# Patient Record
Sex: MALE | Race: WHITE | Employment: FULL TIME | URBAN - NONMETROPOLITAN AREA
[De-identification: names, ages, dates, MRNs, and addresses within clinical notes are randomized per-mention and may not be internally consistent; named-entity substitution may affect disease eponyms.]

---

## 2018-12-06 ENCOUNTER — HOSPITAL ENCOUNTER (OUTPATIENT)
Dept: GENERAL RADIOLOGY | Age: 63
Discharge: HOME | End: 2018-12-06
Payer: COMMERCIAL

## 2018-12-06 ENCOUNTER — HOSPITAL ENCOUNTER (OUTPATIENT)
Age: 63
Discharge: HOME | End: 2018-12-06
Payer: COMMERCIAL

## 2018-12-06 DIAGNOSIS — R05.9 COUGH: ICD-10-CM

## 2018-12-06 PROCEDURE — 71046 X-RAY EXAM CHEST 2 VIEWS: CPT

## 2019-07-31 ENCOUNTER — HOSPITAL ENCOUNTER (OUTPATIENT)
Dept: CT IMAGING | Age: 64
Discharge: HOME | End: 2019-07-31
Payer: COMMERCIAL

## 2019-07-31 DIAGNOSIS — R10.31 ABDOMINAL PAIN, RIGHT LOWER QUADRANT: ICD-10-CM

## 2019-07-31 LAB — POC CREATININE WHOLE BLOOD: 0.9 MG/DL (ref 0.5–1.2)

## 2019-07-31 PROCEDURE — 82565 ASSAY OF CREATININE: CPT

## 2019-07-31 PROCEDURE — 74177 CT ABD & PELVIS W/CONTRAST: CPT

## 2019-07-31 PROCEDURE — 6360000004 HC RX CONTRAST MEDICATION: Performed by: FAMILY MEDICINE

## 2019-07-31 RX ADMIN — IOHEXOL 50 ML: 240 INJECTION, SOLUTION INTRATHECAL; INTRAVASCULAR; INTRAVENOUS; ORAL at 12:03

## 2019-07-31 RX ADMIN — IOPAMIDOL 85 ML: 755 INJECTION, SOLUTION INTRAVENOUS at 12:03

## 2020-06-29 RX ORDER — LOSARTAN POTASSIUM 25 MG/1
50 TABLET ORAL 2 TIMES DAILY
COMMUNITY
End: 2022-04-08 | Stop reason: SDUPTHER

## 2020-06-29 RX ORDER — CARVEDILOL 6.25 MG/1
6.25 TABLET ORAL 2 TIMES DAILY WITH MEALS
COMMUNITY
End: 2022-01-27

## 2020-06-29 RX ORDER — ZOLPIDEM TARTRATE 5 MG/1
5 TABLET ORAL NIGHTLY PRN
COMMUNITY

## 2020-06-29 RX ORDER — ASPIRIN 81 MG/1
81 TABLET ORAL DAILY
COMMUNITY
End: 2022-01-27

## 2020-06-29 RX ORDER — ATORVASTATIN CALCIUM 40 MG/1
40 TABLET, FILM COATED ORAL DAILY
COMMUNITY
End: 2022-04-08 | Stop reason: SDUPTHER

## 2020-06-29 RX ORDER — METFORMIN HYDROCHLORIDE 500 MG/1
1000 TABLET, EXTENDED RELEASE ORAL 2 TIMES DAILY
COMMUNITY

## 2020-08-04 NOTE — PROGRESS NOTES
[facility] and [facility] initial consultation note    Jean-Paul Davis                                             Chief Complaint:  Consults, machine quit working. PSG done on 2005     Chief complaint: Jean-Paul Davis is a 60 y. o.oldmale came for further evaluation regarding his sleep apnea  with referral from self. [place]:    Sleep/Wake schedule:  Usual time to go to bed during the work/regular day of week: 10:00 PM.  Usual time to wake up during the work//regular day of week: 5:25 AM.  Over the weekends his sleep schedule: [x] Remain same. He usually falls a sleep in less than: 10 minutes. He takes naps: Yes. Number of naps per week:  3 times. During each nap he spends a total of: 5 minutes. He is not using his CPAP device during naps. The naps were reported as refreshing: Yes. Sleep Hygiene:    Is the temperature and evironment in his bed room is acceptable to him: Yes. He watches Television in his bed room: No.  He read books, study, pay bills etc in the bed: No.  Frequency He wake up during night/sleep: 2  Majority of nocturnal awakenings are for urination: Yes. Difficulty in falling back to sleep after nocturnal awakenings: Yes- some times    Do you drink coffee: Yes. 2 to 4cup/s per day. Do you drink caffeinated beverages i.e sodas: Yes. 1can/s per week   Do you drink tea: Yes. 1 cup/s/glasse/s per day [place] in [place]. Do you drink alcoholic beverages: No.  History of recreational drug use: No.     History of tobacco smoking:Yes. Amount of tobacco smokin.0 PPD. Years of tobacco smokin.                                    Quit smoking: Yes. Quit year:       Sleep apnea symptoms:  He was diagnosed with severe obstructive sleep apnea in  and was prescribed with a CPAP machine with a pressure of 8cm H20. His old CPAP machine quit working and he needs a new CPAP device.  He denies any problems with his current mask. He is currently using a nasal mask. He used to use his CPAP with good compliance in the past.    He uses 2 CPAP devices. He travels a lot all over the world. History of headaches in the morning:No.  History of dry mouth in the morning: Yes- some times  History of palpitations during night time/nocturnal awakenings: No  History of sweating during night time/nocturnal awakenings: No    General:  History of head injury in the past: Yes-  He had severe brain concussion at the age of 7years. [x] Not due to MVA. Associated loss of consciousness with head injury: No.  History of seizures: NO.  Rest less legs syndrome symptoms:NO  History suggestive of periodic limb movements during sleep: NO  History suggestive of hypnagogic hallucinations: NO  History suggestive of hypnopompic hallucinations: NO  History suggestive of sleep talking: NO  History suggestive of sleep walking:NO  History suggestive of bruxism: NO     History suggestive of cataplexy: NO  History suggestive of sleep paralysis:NO    Family history of sleep disorders:  Family history of obstructive sleep apnea: NO.  Family history of Narcolepsy: NO.  Family history of Rest less legs syndrome : NO.      History regarding old sleep studies:  Prior history of sleep study: Yes. Please see the diagnostic data section for details. Using CPAP device: No.  Currently using home Oxygen: NO.       Patient considerations:  Is the patient is ambulatory: Yes  Patient is currently using: None of these Wheelchair, Berneta Amsler or [employer].   Para/Quadriplegic: NO  Hearing deficit : NO  Claustrophobic: NO  MDD : NO  Blind: NO  Incontinent: NO  Para/Quadraplegi: NO.   Need transportation to and from Sleep Center:NO    Social History:  Social History     Tobacco Use    Smoking status: Former Smoker     Packs/day: 1.00     Types: Cigarettes     Last attempt to quit: 8/31/2005     Years since quitting: 15.0    Smokeless tobacco: Former User Types: Snuff     Quit date: 8/31/2005   Substance Use Topics    Alcohol use: Not on file    Drug use: Not on file   . He is currently working: Yes. He usually goes to his work at:  7:00AM.   He completes his work at:  5:00PM.                        No past medical history on file. Past Surgical History:   Procedure Laterality Date    CARDIAC SURGERY      CHOLECYSTECTOMY      COLONOSCOPY      HERNIA REPAIR      JOINT REPLACEMENT Right        Allergies   Allergen Reactions    Pcn [Penicillins] Hives    Percocet [Oxycodone-Acetaminophen] Nausea And Vomiting       Current Outpatient Medications   Medication Sig Dispense Refill    carvedilol (COREG) 6.25 MG tablet Take 6.25 mg by mouth 2 times daily (with meals)      losartan (COZAAR) 25 MG tablet Take 25 mg by mouth daily      metFORMIN (GLUCOPHAGE-XR) 500 MG extended release tablet Take 500 mg by mouth daily (with breakfast)      atorvastatin (LIPITOR) 40 MG tablet Take 40 mg by mouth daily      zolpidem (AMBIEN) 5 MG tablet Take 5 mg by mouth nightly as needed for Sleep.  aspirin 81 MG EC tablet Take 81 mg by mouth daily      blood glucose test strips (ASCENSIA AUTODISC VI;ONE TOUCH ULTRA TEST VI) strip 1 each by In Vitro route daily As needed. No current facility-administered medications for this visit. No family history on file. Review of Systems:    General/Constitutional: He gained ~100lbs of weight from his old sleep study done in 2005 with normal appetite. No fever or chills. HENT: Negative. Eyes: Negative. Upper respiratory tract: No nasal stuffiness or post nasal drip. Lower respiratory tract/ lungs: No cough or sputum production. No hemoptysis. Cardiovascular: No palpitations or chest pain. Gastrointestinal: No nausea or vomiting. Neurological: No focal neurologiacal weakness. Extremities: No edema. Musculoskeletal: No complaints. Genitourinary: No complaints. Hematological: Negative. Level  8.0      Average usuage hours per day was:6 hours 44 minutes 56 seconds.           Interface: Nasal     Provider:  []?[facility]             []?[facility]                        []?[employer]          []? [employer]                           []?[facility]      [x]? Other: [facility] joe SCHULTE get download at [address] [facility]         Assessment:  -Severe obstructive sleep apnea on treatment with a CPAP 8cm H20. He is using his PAP device with excellent compliance for >4hours and benefiting from it. His old CPAP machine quit working and he needs a new CPAP device. He denies any problems with his current mask. He gained ~100lbs of weight from his old sleep study done in 2005.  -Inadequate sleep hygiene. -CAD s/p CABG in 2018. It was performed in [place]. -Hypertension on meds- under control.  -Hx of AAA. He is following with his family physicin Dr. Keenan Browne MD.  -Type II DM.  -Hypersomnia ( Excessive daytime sleepiness) due to obstructive sleep apnea Vs Inadequate sleep hygiene. Recommendations/Plan:  -He was offered to have a CPAP retitration at [facility] sleep lab as soon as possible due to a significant weight gain I.e ~100lbs of weight from his old sleep study in 2005. How ever he refused to for re titration study at this time. He want to go for a new CPAP device due to non functioning old machine.  -At the request of patient, he was given a prescription for a new CPAP machine with current pressure of 8cm H20. The new CPAP machine must have capabilities to give downloads regarding his residual AHI and >4hour compliance.  -He advised to keep good compliance with current recommended pressure to get optimal results and clinical improvement.  -Follow with my clinic in 2 to 3months for clinical reevaluation with review of download. -He was advised to call [employer] regarding supplies if needed. -He was advised to practice good sleep hygiene techniques.  He was provided with a hand out.  -He was instructed to not to drive any motor vehicles or operate heavy equipment if he feels sleepy. -He was advised to loose weight by controlling diet and doing exercise once cleared by his family physician/Cardiologist   -He was advised call my office for earlier appointment if needed for worsening of sleep symptoms.   -Chikis Adler and his wife were educated about my impression and plan. Patient verbalizes understanding.

## 2020-08-31 ENCOUNTER — INITIAL CONSULT (OUTPATIENT)
Dept: PULMONOLOGY | Age: 65
End: 2020-08-31
Payer: COMMERCIAL

## 2020-08-31 VITALS
BODY MASS INDEX: 41.95 KG/M2 | HEIGHT: 70 IN | SYSTOLIC BLOOD PRESSURE: 130 MMHG | DIASTOLIC BLOOD PRESSURE: 80 MMHG | OXYGEN SATURATION: 97 % | WEIGHT: 293 LBS | HEART RATE: 81 BPM | TEMPERATURE: 97.8 F

## 2020-08-31 PROCEDURE — 99204 OFFICE O/P NEW MOD 45 MIN: CPT | Performed by: INTERNAL MEDICINE

## 2020-08-31 NOTE — PATIENT INSTRUCTIONS
Recommendations/Plan:  -He was offered to have a CPAP retitration at [facility] sleep lab as soon as possible due to a significant weight gain I.e ~100lbs of weight from his old sleep study in 2005. How ever he refused to for re titration study at this time. He want to go for a new CPAP device due to non functioning old machine.  -At the request of patient, he was given a prescription for a new CPAP machine with current pressure of 8cm H20. The new CPAP machine must have capabilities to give downloads regarding his residual AHI and >4hour compliance.  -He advised to keep good compliance with current recommended pressure to get optimal results and clinical improvement.  -Follow with my clinic in 2 to 3months for clinical reevaluation with review of download. -He was advised to call [employer] regarding supplies if needed. -He was advised to practice good sleep hygiene techniques. He was provided with a hand out.  -He was instructed to not to drive any motor vehicles or operate heavy equipment if he feels sleepy. -He was advised to loose weight by controlling diet and doing exercise once cleared by his family physician/Cardiologist   -He was advised call my office for earlier appointment if needed for worsening of sleep symptoms.   -Hannah Rocha and his wife were educated about my impression and plan. Patient verbalizes understanding.

## 2020-08-31 NOTE — PROGRESS NOTES
Chief Complaint:  Consults, machine quit working. PSG done on 04/07/2005  Mallampati airway Class:III  Neck Circumference:20.0 Inches    [place] sleepiness score 8/31/20: {NUMBERS 1-24:00106}. Diagnostic Data:   PAP Download:   Recorded compliance dates:  08/01/2020-08/30/2020  More than 4hour usage compliance was:96.7%. Average residual Apnea- Hypoapnea index on current pressue was:1.0.      PAP Type Cpap   Level  8.0     Average usuage hours per day was:6 hours 44 minutes 56 seconds.      Interface: Nasal    Provider:  [][facility]  [][facility]  [][employer]  [][employer]         [][facility] [x]Other: [facility] ERIS, did get download at [facility]

## 2020-11-16 ENCOUNTER — HOSPITAL ENCOUNTER (OUTPATIENT)
Age: 65
Setting detail: SPECIMEN
Discharge: HOME | End: 2020-11-16
Payer: COMMERCIAL

## 2020-11-16 PROCEDURE — U0003 INFECTIOUS AGENT DETECTION BY NUCLEIC ACID (DNA OR RNA); SEVERE ACUTE RESPIRATORY SYNDROME CORONAVIRUS 2 (SARS-COV-2) (CORONAVIRUS DISEASE [COVID-19]), AMPLIFIED PROBE TECHNIQUE, MAKING USE OF HIGH THROUGHPUT TECHNOLOGIES AS DESCRIBED BY CMS-2020-01-R: HCPCS

## 2020-11-17 LAB — SARS-COV-2: DETECTED

## 2021-07-19 ENCOUNTER — TELEPHONE (OUTPATIENT)
Dept: PULMONOLOGY | Age: 66
End: 2021-07-19

## 2021-07-19 NOTE — TELEPHONE ENCOUNTER
Noted.  Some insurances will stop paying for PAP if they do not follow up with in 6-8 weeks.   He needs to check with his insurance

## 2021-07-19 NOTE — TELEPHONE ENCOUNTER
Patient was scheduled for 7/22 due to a new   but patient needed to reschedule due to not in town. He works out of town and he isn't sure when he is coming back but He stated he has been wearing the machine and everything is good for now but wasn't sure if he was able to talk to you over the phone but I did set up for an appt for 10/21 since he will be off that day.

## 2021-07-19 NOTE — TELEPHONE ENCOUNTER
I called and spoke to wife and She will let him know to check with [employer] as well. For now we will kept appt for 10/21.

## 2021-10-21 ENCOUNTER — OFFICE VISIT (OUTPATIENT)
Dept: PULMONOLOGY | Age: 66
End: 2021-10-21
Payer: COMMERCIAL

## 2021-10-21 VITALS
OXYGEN SATURATION: 98 % | DIASTOLIC BLOOD PRESSURE: 82 MMHG | TEMPERATURE: 97.5 F | WEIGHT: 276.2 LBS | SYSTOLIC BLOOD PRESSURE: 136 MMHG | HEIGHT: 70 IN | BODY MASS INDEX: 39.54 KG/M2 | HEART RATE: 65 BPM

## 2021-10-21 DIAGNOSIS — Z99.89 OSA ON CPAP: Primary | ICD-10-CM

## 2021-10-21 DIAGNOSIS — G47.33 OSA ON CPAP: Primary | ICD-10-CM

## 2021-10-21 DIAGNOSIS — E66.9 OBESITY (BMI 30-39.9): ICD-10-CM

## 2021-10-21 PROCEDURE — 99213 OFFICE O/P EST LOW 20 MIN: CPT | Performed by: PHYSICIAN ASSISTANT

## 2021-10-21 RX ORDER — DAPAGLIFLOZIN 5 MG/1
TABLET, FILM COATED ORAL
COMMUNITY
Start: 2021-07-15 | End: 2022-04-08 | Stop reason: SDUPTHER

## 2021-10-21 ASSESSMENT — ENCOUNTER SYMPTOMS
WHEEZING: 0
NAUSEA: 0
DIARRHEA: 0
CHEST TIGHTNESS: 0
STRIDOR: 0
BACK PAIN: 0
ALLERGIC/IMMUNOLOGIC NEGATIVE: 1
SHORTNESS OF BREATH: 0
EYES NEGATIVE: 1
COUGH: 0

## 2021-10-21 NOTE — PROGRESS NOTES
[facility]      Balwinder Romero         [id number]  10/21/2021   Chief Complaint   Patient presents with    Follow-up     RAISA new machine         Pt of Dr. Aisha Higgins    PAP Download:   Zahra Leal or initial AHI: 92.2     Date of initial study: 7/9/2011      Compliant  100%     Noncompliant 0 %     PAP Type AIrsense 10 autoset Level  8   Avg Hrs/Day 7 hr 19 min  AHI: 0.7   Recorded compliance dates , 9/19/2021  to 10/18/2021   Machine/Mfg:   [x] ResMed    [] Respironics/Dreamstation   Interface:   [x] Nasal    [] Nasal pillows   [] FFM      Provider:      [x] [facility]     [][facility]     [] [employer]    [] [employer]    [] [employer]               [] [facility]      [] [employer]    [] [address]      [] Other    Neck Size: 20  Mallampati Mallampati 3  ESS:  4  SAQLI: 98    Here is a scan of the most recent download:            Presentation:   Jamal Macedo presents for sleep medicine follow up for obstructive sleep apnea  Since the last visit, Jamal Macedo is doing well with new PAP. He was set up 4-5 months ago on new PAP. Occ waking up through the night since having Covid in Nov.  Occ taking Ambien 2.5- 5 mg or Melatonin for insomnia. He does not take both at same time. He has 2 PAPs and wants to use one for travel and one for home. He travels frequently for work. Equipment issues: The pressure is  acceptable, the mask is acceptable     Sleep issues:  Do you feel better? Yes  More rested? Yes   Better concentration? yes    Progress History:   Since last visit any new medical issues? Covid  New ER or hospital visits? No  Any new or changes in medicines? No  Any new sleep medicines? No    Review of Systems -   Review of Systems   Constitutional: Negative for activity change, appetite change, chills and fever. HENT: Negative for congestion and postnasal drip. Eyes: Negative. Respiratory: Negative for cough, chest tightness, shortness of breath, wheezing and stridor. Cardiovascular: Negative for chest pain and leg swelling. Gastrointestinal: Negative for diarrhea and nausea. Endocrine: Negative. Genitourinary: Negative. Musculoskeletal: Negative. Negative for arthralgias and back pain. Skin: Negative. Allergic/Immunologic: Negative. Neurological: Negative. Negative for dizziness and light-headedness. Psychiatric/Behavioral: Negative. All other systems reviewed and are negative. Physical Exam:    BMI:  Body mass index is 39.63 kg/m². Wt Readings from Last 3 Encounters:   10/21/21 276 lb 3.2 oz (125.3 kg)   08/31/20 293 lb (132.9 kg)     Weight lost 17 lbs over 1 year  Vitals: /82 (Site: Right Upper Arm, Position: Sitting, Cuff Size: Large Adult)   Pulse 65   Temp 97.5 °F (36.4 °C) (Temporal)   Ht 5' 10\" (1.778 m)   Wt 276 lb 3.2 oz (125.3 kg)   SpO2 98%   BMI 39.63 kg/m²       Physical Exam  Constitutional:       Appearance: He is well-developed. HENT:      Head: Normocephalic and atraumatic. Right Ear: External ear normal.      Left Ear: External ear normal.   Eyes:      Conjunctiva/sclera: Conjunctivae normal.      Pupils: Pupils are equal, round, and reactive to light. Cardiovascular:      Rate and Rhythm: Normal rate and regular rhythm. Heart sounds: Normal heart sounds. Pulmonary:      Effort: Pulmonary effort is normal.      Breath sounds: Normal breath sounds. Musculoskeletal:         General: Normal range of motion. Cervical back: Normal range of motion and neck supple. Skin:     General: Skin is warm and dry. Neurological:      Mental Status: He is alert and oriented to person, place, and time. Psychiatric:         Behavior: Behavior normal.         Thought Content: Thought content normal.         Judgment: Judgment normal.           ASSESSMENT/DIAGNOSIS     Diagnosis Orders   1. RAISA on CPAP     2. Obesity (BMI 30-39. 9)              Plan   Do you need any equipment today?  Yes update supplies  - Will need download from both PAPs next year  - Download reviewed and discussed with patient  - He  was advised to continue current positive airway pressure therapy with above described pressure. - He  advised to keep good compliance with current recommended pressure to get optimal results and clinical improvement  - Recommend 7-9 hours of sleep with PAP  - He was advised to call [employer] regarding supplies if needed.   -He call my office for earlier appointment if needed for worsening of sleep symptoms.   - He was instructed on weight loss  - Candelario Favre was educated about my impression and plan. Patient verbalizesunderstanding.   We will see Taylor Comment back in: 1 months with download    Information added by my medical assistant/LPN was reviewed today          Lord Simba HADDAD, VIET  10/21/2021

## 2021-12-15 ENCOUNTER — TELEPHONE (OUTPATIENT)
Dept: CARDIOLOGY CLINIC | Age: 66
End: 2021-12-15

## 2021-12-15 NOTE — TELEPHONE ENCOUNTER
Lm for pt to call office pt needs new pt appt with Dr. Parmjit Rose after his stress and echo on 1/3/2022

## 2022-01-10 ENCOUNTER — HOSPITAL ENCOUNTER (OUTPATIENT)
Dept: NON INVASIVE DIAGNOSTICS | Age: 67
Discharge: HOME | End: 2022-01-10
Payer: COMMERCIAL

## 2022-01-10 VITALS — HEIGHT: 70 IN | WEIGHT: 272 LBS | BODY MASS INDEX: 38.94 KG/M2

## 2022-01-10 DIAGNOSIS — I25.10 ATHEROSCLEROSIS OF NATIVE CORONARY ARTERY OF NATIVE HEART WITHOUT ANGINA PECTORIS: ICD-10-CM

## 2022-01-10 DIAGNOSIS — R00.1 SEVERE SINUS BRADYCARDIA: ICD-10-CM

## 2022-01-10 LAB
LV EF: 53 %
LVEF MODALITY: NORMAL

## 2022-01-10 PROCEDURE — 78452 HT MUSCLE IMAGE SPECT MULT: CPT | Performed by: INTERNAL MEDICINE

## 2022-01-10 PROCEDURE — 93306 TTE W/DOPPLER COMPLETE: CPT

## 2022-01-10 PROCEDURE — 93016 CV STRESS TEST SUPVJ ONLY: CPT | Performed by: INTERNAL MEDICINE

## 2022-01-10 PROCEDURE — A9500 TC99M SESTAMIBI: HCPCS | Performed by: FAMILY MEDICINE

## 2022-01-10 PROCEDURE — 3430000000 HC RX DIAGNOSTIC RADIOPHARMACEUTICAL: Performed by: FAMILY MEDICINE

## 2022-01-10 PROCEDURE — 78452 HT MUSCLE IMAGE SPECT MULT: CPT

## 2022-01-10 PROCEDURE — 93017 CV STRESS TEST TRACING ONLY: CPT | Performed by: INTERNAL MEDICINE

## 2022-01-10 PROCEDURE — 6360000002 HC RX W HCPCS

## 2022-01-10 PROCEDURE — 93018 CV STRESS TEST I&R ONLY: CPT | Performed by: INTERNAL MEDICINE

## 2022-01-10 RX ADMIN — Medication 30.7 MILLICURIE: at 13:15

## 2022-01-10 RX ADMIN — Medication 8.1 MILLICURIE: at 12:15

## 2022-01-27 ENCOUNTER — OFFICE VISIT (OUTPATIENT)
Dept: CARDIOLOGY CLINIC | Age: 67
End: 2022-01-27
Payer: COMMERCIAL

## 2022-01-27 VITALS
SYSTOLIC BLOOD PRESSURE: 140 MMHG | HEART RATE: 44 BPM | BODY MASS INDEX: 40.63 KG/M2 | WEIGHT: 283.2 LBS | DIASTOLIC BLOOD PRESSURE: 74 MMHG

## 2022-01-27 DIAGNOSIS — E78.5 HYPERLIPIDEMIA, UNSPECIFIED HYPERLIPIDEMIA TYPE: ICD-10-CM

## 2022-01-27 DIAGNOSIS — Z95.1 HX OF CABG: Primary | ICD-10-CM

## 2022-01-27 PROCEDURE — 99204 OFFICE O/P NEW MOD 45 MIN: CPT | Performed by: INTERNAL MEDICINE

## 2022-01-27 RX ORDER — ASPIRIN 325 MG
325 TABLET ORAL DAILY
COMMUNITY
End: 2022-01-27

## 2022-01-27 NOTE — PROGRESS NOTES
Moved from [place] 4 years ago    Pt travels a lot for work    Losartan was increased from 25 daily to 50 mg bid

## 2022-01-27 NOTE — PROGRESS NOTES
[id number] [facility] [place] [address]  [facility] [place] [place] [address]  Dept: [phone]  Dept Fax: [phone]  Loc: [phone]    Visit Date: 1/27/2022    Mr. Sandra Blackwell is a 61 y.o. male  who presented for:  Chief Complaint   Patient presents with    New Patient    Palpitations       HPI:   HPI   62 yo M hx of CABG 2018, DM II, bradycardia, neuropathy, RAISA on CPAP works as a  who presents to establish care. He noticed some fluttering in his chest last year. NP stopped Coreg. Fluttering stopped. He had COVID last year. Moved from [facility] to [address] [place] [address] to retire. He notices a pounding heart beat. No lightheadedness, no dizziness. No significant a/t/d. Wife present. BB caused significant fatigue. No blood thinner. No chest pain, angina, TOMPKINS, orthopnea, PND, sob at rest, LE edema, or syncope. Right sided pinching headache at times as well. No major family hx.  7 kids total. Daily ASA compliant. Current Outpatient Medications:     aspirin 325 MG tablet, Take 325 mg by mouth daily, Disp: , Rfl:     FARXIGA 5 MG tablet, TAKE 1 TABLET BY MOUTH ONCE DAILY, Disp: , Rfl:     losartan (COZAAR) 25 MG tablet, Take 50 mg by mouth 2 times daily , Disp: , Rfl:     metFORMIN (GLUCOPHAGE-XR) 500 MG extended release tablet, Take 1,000 mg by mouth 2 times daily , Disp: , Rfl:     atorvastatin (LIPITOR) 40 MG tablet, Take 40 mg by mouth daily, Disp: , Rfl:     zolpidem (AMBIEN) 5 MG tablet, Take 5 mg by mouth nightly as needed for Sleep., Disp: , Rfl:     blood glucose test strips (ASCENSIA AUTODISC VI;ONE TOUCH ULTRA TEST VI) strip, 1 each by In Vitro route daily As needed. , Disp: , Rfl:     Past Medical History  Anna Godfrey  has no past medical history on file. Social History  Anna Godfrey  reports that he quit smoking about 16 years ago. His smoking use included cigarettes. He smoked 1.00 pack per day.  He quit smokeless tobacco use about 16 years ago. His smokeless tobacco use included snuff. Family History  Forest Cervantes family history includes Heart Surgery in his father. There is no family history of bicuspid aortic valve, aneurysms, heart transplant, pacemakers, defibrillators, or sudden cardiac death. Past Surgical History   Past Surgical History:   Procedure Laterality Date    CARDIAC SURGERY      CHOLECYSTECTOMY      COLONOSCOPY      HERNIA REPAIR      JOINT REPLACEMENT Right        Review of Systems   Constitutional: Negative for chills and fever  HENT: Negative for congestion, sinus pressure, sneezing and sore throat. Eyes: Negative for pain, discharge, redness and itching. Respiratory: Negative for apnea, cough  Gastrointestinal: Negative for blood in stool, constipation, diarrhea   Endocrine: Negative for cold intolerance, heat intolerance, polydipsia. Genitourinary: Negative for dysuria, enuresis, flank pain and hematuria. Musculoskeletal: Negative for arthralgias, joint swelling and neck pain. Neurological: Negative for numbness and headaches. Psychiatric/Behavioral: Negative for agitation, confusion, decreased concentration and dysphoric mood. Objective:     BP (!) 150/68   Pulse (!) 44   Wt 283 lb 3.2 oz (128.5 kg)   BMI 40.63 kg/m²     Wt Readings from Last 3 Encounters:   01/27/22 283 lb 3.2 oz (128.5 kg)   01/10/22 272 lb (123.4 kg)   10/21/21 276 lb 3.2 oz (125.3 kg)     BP Readings from Last 3 Encounters:   01/27/22 (!) 150/68   10/21/21 136/82   08/31/20 130/80       Nursing note and vitals reviewed. Physical Exam   Constitutional: Oriented to person, place, and time. Appears well-developed and well-nourished. HENT:   Head: Normocephalic and atraumatic. Eyes: EOM are normal. Pupils are equal, round, and reactive to light. Neck: Normal range of motion. Neck supple. No JVD present. Cardiovascular: Normal rate, regular rhythm, normal heart sounds and intact distal pulses.     No murmur heard.  Pulmonary/Chest: Effort normal and breath sounds normal. No respiratory distress. No wheezes. No rales. Abdominal: Soft. Bowel sounds are normal. No distension. There is no tenderness. Musculoskeletal: Normal range of motion. No edema. Neurological: Alert and oriented to person, place, and time. No cranial nerve deficit. Coordination normal.   Skin: Skin is warm and dry. Psychiatric: Normal mood and affect. No results found for: CKTOTAL, CKMB, CKMBINDEX    No results found for: WBC, RBC, HGB, HCT, MCV, MCH, MCHC, RDW, PLT, MPV    No results found for: NA, K, CL, CO2, BUN, LABALBU, CREATININE, CALCIUM, GFRAA, LABGLOM, GLUCOSE, GLU    No results found for: ALKPHOS, ALT, AST, PROT, BILITOT, BILIDIR, IBILI, LABALBU    No results found for: MG    No results found for: INR, PROTIME      No results found for: LABA1C    No results found for: TRIG, HDL, LDLCALC, LDLDIRECT, LABVLDL    No results found for: TSH      Testing Reviewed:      I have individually reviewed the cardiac test below:    ECHO: Results for orders placed during the hospital encounter of 01/10/22    Echocardiogram complete    Narrative  Transthoracic Echocardiography Report (TTE)    Demographics    Patient Name  Montrell Angel       Gender            Male  Eliceo Forward    MR #          [id number]       Race              Other    Ethnicity    Account #     [de-identified]       Room Number    Accession     [id number]      Date of Study     01/10/2022  Number    Date of Birth 1955      Referring         Davonte Sánchez MD  Physician    Age           61 year(s)      Sonographer       CYNTHIA Mckeon, RDCS,  RDMS, RVT    Interpreting      Bradley Orellana MD  Physician    Procedure    Type of Study    TTE procedure:ECHOCARDIOGRAM COMPLETE 2D W DOPPLER W COLOR. Procedure Date  Date: 01/10/2022 Start: 10:31 AM    Study Location: Echo Lab  Technical Quality: Adequate visualization    Indications:Bradycardia.     Additional Medical History:sleep apnea, obesity, AAA    Patient Status: Routine    Height: 70 inches Weight: 276.01 pounds BSA: 2.39 m^2 BMI: 39.6 kg/m^2    BP: 136/82 mmHg    Conclusions    Summary  Ejection fraction was estimated at 50-55%. The aortic valve was trileaflet with increased thickness and reduced  cuspal separation. DOPPLER: Transaortic velocity was 2.3 m/s, mean  gradient 11 mmHg, max gradient 21 mmHg, LEONEL 2.4 cm2. There was trace  aortic regurgitation. Ascending aorta = 4.1 cm. IVC size is within normal limits with normal respiratory phasic changes. Signature    ----------------------------------------------------------------  Electronically signed by Larry Beverly MD (Interpreting  physician) on 01/11/2022 at 07:21 AM  ----------------------------------------------------------------    Findings    Mitral Valve  The mitral valve structure demonstrated posterior leaflet calcification. DOPPLER: The transmitral velocity was within the normal range with no  evidence for mitral stenosis. There was no evidence of mitral  regurgitation. Aortic Valve  The aortic valve was trileaflet with increased thickness and reduced  cuspal separation. DOPPLER: Transaortic velocity was 2.3 m/s, mean  gradient 11 mmHg, max gradient 21 mmHg, LEONEL 2.4 cm2. There was trace  aortic regurgitation. Tricuspid Valve  The tricuspid valve structure was normal with normal leaflet separation. DOPPLER: There was no evidence of tricuspid stenosis. There was no  evidence of tricuspid regurgitation. Pulmonic Valve  The pulmonic valve leaflets exhibited normal thickness, no calcification,  and normal cuspal separation. DOPPLER: The transpulmonic velocity was  within the normal range with no evidence for regurgitation. Left Atrium  Left atrial size was normal.    Left Ventricle  Normal left ventricular size and systolic function. There were no regional wall motion abnormalities. Wall thickness was within normal limits.   Ejection fraction was estimated at 50-55%. Right Atrium  Right atrial size was normal.    Right Ventricle  The right ventricular size was normal with normal systolic function and  wall thickness. Pericardial Effusion  The pericardium was normal in appearance with no evidence of a pericardial  effusion. Pleural Effusion  No evidence of pleural effusion. Aorta / Great Vessels  -Ascending aorta = 4.1 cm. -IVC size is within normal limits with normal respiratory phasic changes.     M-Mode/2D Measurements & Calculations    LV Diastolic    LV Systolic Dimension:    AV Cusp Separation: 1.2 cmLA  Dimension: 5.2  3.7 cm                    Dimension: 4 cmAO Root  cm              LV Volume Diastolic: 400  Dimension: 4.1 cmLA Area: 22.9  LV FS:28.9 %    ml                        cm^2  LV PW           LV Volume Systolic: 66.8  Diastolic: 1.4  ml  cm              LV EDV/LV EDV Index: 130  Septum          ml/54 m^2LV ESV/LV ESV    RV Diastolic Dimension: 3.4 cm  Diastolic: 1.2  Index: 23.3 ml/24 m^2  cm              EF Calculated: 55.3 %     LA/Aorta: 0.98  Ascending Aorta: 4.1 cm  LA volume/Index: 68.9 ml /29m^2    LVOT: 2.3 cm    Doppler Measurements & Calculations    MV Peak E-Wave: 113 AV Peak Velocity: 230    LVOT Peak Velocity: 111 cm/s  cm/s                cm/s                     LVOT Mean Velocity: 76.4 cm/s  MV Peak A-Wave: 106 AV Peak Gradient: 21.16  LVOT Peak Gradient: 5  cm/s                mmHg                     mmHgLVOT Mean Gradient: 3  MV E/A Ratio: 1.07  AV Mean Velocity: 153    mmHg  MV Peak Gradient:   cm/s  5.11 mmHg           AV Mean Gradient: 13     TV Peak E-Wave: 50.6 cm/s  mmHg                     TV Peak A-Wave: 51 cm/s  MV Deceleration     AV VTI: 45 cm  Time: 174 msec      AV Area                  TV Peak Gradient: 1.02 mmHg  (Continuity):2.14 cm^2   TR Velocity:263 cm/s  TR Gradient:27.67 mmHg  MV E' Septal        LVOT VTI: 23.2 cm        PV Peak Velocity: 67.7 cm/s  Velocity: 5.1 cm/s  AV P1/2t: 729 msec       PV Peak Gradient: 1.83 mmHg  MV A' Septal        IVRT: 95 msec  Velocity: 9.1 cm/s  MV E' Lateral  Velocity: 8.5 cm/s  AV DVI (VTI): 0.52AV DVI  MV A' Lateral       (Vmax):0.48  Velocity: 10.2 cm/s  E/E' septal: 22.16  E/E' lateral: 13.29  MR Velocity: 375  cm/s    [email or web address] [id number]       Assessment/Plan   CABG 2018  DM II  HTN  HLD  Sinus bradycardia  Preserved EF   Ascending aorta 4.1 cm. Stress shows inferior defect with tanya-infarct ischemia  Records from [place], [facility], has had bleeding related to  mg q day, continue current therapy. BP 150s. Cut ASA back to 81 mg q day due to epistaxis. Usually BP 130s. Continue statin. Follow FLP. Discussed diet/exercise/BP/weight loss/health lifestyle choices/lipids; the patient understands the goals and will try to comply.     Disposition:  6 months         Electronically signed by Tapan Mccallum MD   1/27/2022 at 3:21 PM EST

## 2022-04-08 RX ORDER — DAPAGLIFLOZIN 5 MG/1
TABLET, FILM COATED ORAL
Qty: 30 TABLET | Status: CANCELLED | OUTPATIENT
Start: 2022-04-08

## 2022-04-08 RX ORDER — METFORMIN HYDROCHLORIDE 500 MG/1
1000 TABLET, EXTENDED RELEASE ORAL 2 TIMES DAILY
Qty: 30 TABLET | Status: CANCELLED | OUTPATIENT
Start: 2022-04-08

## 2022-04-08 RX ORDER — ZOLPIDEM TARTRATE 5 MG/1
5 TABLET ORAL NIGHTLY PRN
Status: CANCELLED | OUTPATIENT
Start: 2022-04-08

## 2022-04-08 NOTE — TELEPHONE ENCOUNTER
Mehreen Pierre called requesting a refill on the following medications:  Requested Prescriptions     Pending Prescriptions Disp Refills    atorvastatin (LIPITOR) 40 MG tablet 30 tablet      Sig: Take 1 tablet by mouth daily    metFORMIN (GLUCOPHAGE-XR) 500 MG extended release tablet 30 tablet      Sig: Take 2 tablets by mouth 2 times daily    losartan (COZAAR) 25 MG tablet 30 tablet      Sig: Take 2 tablets by mouth 2 times daily    FARXIGA 5 MG tablet 30 tablet     zolpidem (AMBIEN) 5 MG tablet       Sig: Take 1 tablet by mouth nightly as needed for Sleep. Pharmacy verified: [place] in [facility]  . pv      Date of last visit: 1/27/2022  Date of next visit (if applicable): 4/98/4460

## 2022-04-09 RX ORDER — LOSARTAN POTASSIUM 25 MG/1
50 TABLET ORAL 2 TIMES DAILY
Qty: 60 TABLET | Refills: 2 | Status: SHIPPED | OUTPATIENT
Start: 2022-04-09 | End: 2022-06-13 | Stop reason: SDUPTHER

## 2022-04-09 RX ORDER — ATORVASTATIN CALCIUM 40 MG/1
40 TABLET, FILM COATED ORAL DAILY
Qty: 30 TABLET | Refills: 2 | Status: SHIPPED | OUTPATIENT
Start: 2022-04-09 | End: 2022-07-22 | Stop reason: SDUPTHER

## 2022-04-09 RX ORDER — DAPAGLIFLOZIN 5 MG/1
TABLET, FILM COATED ORAL
Qty: 30 TABLET | Refills: 2 | Status: SHIPPED | OUTPATIENT
Start: 2022-04-09 | End: 2022-08-30 | Stop reason: SDUPTHER

## 2022-05-02 RX ORDER — METFORMIN HYDROCHLORIDE 500 MG/1
1000 TABLET, EXTENDED RELEASE ORAL 2 TIMES DAILY
Qty: 30 TABLET | OUTPATIENT
Start: 2022-05-02

## 2022-05-02 NOTE — TELEPHONE ENCOUNTER
Lola Bashir called requesting a refill on the following medications:  Requested Prescriptions     Pending Prescriptions Disp Refills    metFORMIN (GLUCOPHAGE-XR) 500 MG extended release tablet 30 tablet      Sig: Take 2 tablets by mouth 2 times daily     Pharmacy verified: [facility] in [address] [place] [address]  . pv      Date of last visit: 1/27/2022  Date of next visit (if applicable): 8/13/7644

## 2022-05-03 ENCOUNTER — TELEPHONE (OUTPATIENT)
Dept: CARDIOLOGY CLINIC | Age: 67
End: 2022-05-03

## 2022-05-03 NOTE — TELEPHONE ENCOUNTER
Pt called about his Metformin prescription refill being denied. Pt states Dr Blake Higgins told pt he would take over all of pt's medications. Okay to send in refill for Metformin?    Send to [facility] [employer]

## 2022-05-04 NOTE — TELEPHONE ENCOUNTER
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 Mihn Willson). I called patient and his spouse picked up and she is aware Dr. Brody Olivier will do all his cardiac meds but Metformin needs to be managed by PCP.

## 2022-06-13 RX ORDER — LOSARTAN POTASSIUM 25 MG/1
50 TABLET ORAL 2 TIMES DAILY
Qty: 120 TABLET | Refills: 1 | Status: SHIPPED | OUTPATIENT
Start: 2022-06-13 | End: 2022-08-30 | Stop reason: SDUPTHER

## 2022-06-13 NOTE — TELEPHONE ENCOUNTER
Julia Has called requesting a refill on the following medications:  Requested Prescriptions     Pending Prescriptions Disp Refills    losartan (COZAAR) 25 MG tablet 60 tablet 2     Sig: Take 2 tablets by mouth 2 times daily     [address]  . pv      Date of last visit: 127/2022  Date of next visit (if applicable): 9/13/4638    Took last tablet this morning

## 2022-07-22 RX ORDER — ATORVASTATIN CALCIUM 40 MG/1
40 TABLET, FILM COATED ORAL DAILY
Qty: 30 TABLET | Refills: 1 | Status: SHIPPED | OUTPATIENT
Start: 2022-07-22 | End: 2022-08-30 | Stop reason: SDUPTHER

## 2022-07-22 NOTE — TELEPHONE ENCOUNTER
Byron Archer called requesting a refill on the following medications:  Requested Prescriptions     Pending Prescriptions Disp Refills    atorvastatin (LIPITOR) 40 MG tablet 30 tablet 2     Sig: Take 1 tablet by mouth in the morning.      Pharmacy verified:  [facility]      Date of last visit: 01-27-22  Date of next visit (if applicable): 4/88/8854

## 2022-08-29 ENCOUNTER — HOSPITAL ENCOUNTER (OUTPATIENT)
Dept: ULTRASOUND IMAGING | Age: 67
Discharge: HOME | End: 2022-08-29
Payer: COMMERCIAL

## 2022-08-29 DIAGNOSIS — N40.1 BENIGN PROSTATIC HYPERPLASIA WITH URINARY FREQUENCY: ICD-10-CM

## 2022-08-29 DIAGNOSIS — I71.40 ABDOMINAL AORTIC ANEURYSM WITHOUT RUPTURE: ICD-10-CM

## 2022-08-29 DIAGNOSIS — R35.0 BENIGN PROSTATIC HYPERPLASIA WITH URINARY FREQUENCY: ICD-10-CM

## 2022-08-29 PROCEDURE — 76775 US EXAM ABDO BACK WALL LIM: CPT | Performed by: FAMILY MEDICINE

## 2022-08-29 PROCEDURE — 76775 US EXAM ABDO BACK WALL LIM: CPT

## 2022-08-30 ENCOUNTER — TELEPHONE (OUTPATIENT)
Dept: CARDIOLOGY CLINIC | Age: 67
End: 2022-08-30

## 2022-08-30 ENCOUNTER — OFFICE VISIT (OUTPATIENT)
Dept: CARDIOLOGY CLINIC | Age: 67
End: 2022-08-30
Payer: COMMERCIAL

## 2022-08-30 VITALS
WEIGHT: 278.6 LBS | BODY MASS INDEX: 39.88 KG/M2 | HEIGHT: 70 IN | SYSTOLIC BLOOD PRESSURE: 152 MMHG | HEART RATE: 82 BPM | DIASTOLIC BLOOD PRESSURE: 93 MMHG

## 2022-08-30 DIAGNOSIS — E78.5 HYPERLIPIDEMIA, UNSPECIFIED HYPERLIPIDEMIA TYPE: ICD-10-CM

## 2022-08-30 DIAGNOSIS — I71.21 ANEURYSM, ASCENDING AORTA: ICD-10-CM

## 2022-08-30 DIAGNOSIS — I50.30 HEART FAILURE WITH PRESERVED EJECTION FRACTION, UNSPECIFIED HF CHRONICITY (HCC): ICD-10-CM

## 2022-08-30 DIAGNOSIS — Z95.1 HX OF CABG: Primary | ICD-10-CM

## 2022-08-30 PROCEDURE — 99213 OFFICE O/P EST LOW 20 MIN: CPT | Performed by: NURSE PRACTITIONER

## 2022-08-30 RX ORDER — ATORVASTATIN CALCIUM 40 MG/1
40 TABLET, FILM COATED ORAL DAILY
Qty: 30 TABLET | Refills: 1 | Status: SHIPPED | OUTPATIENT
Start: 2022-08-30

## 2022-08-30 RX ORDER — DAPAGLIFLOZIN 5 MG/1
TABLET, FILM COATED ORAL
Qty: 30 TABLET | Refills: 2 | Status: SHIPPED | OUTPATIENT
Start: 2022-08-30

## 2022-08-30 RX ORDER — ASPIRIN 81 MG/1
81 TABLET ORAL DAILY
COMMUNITY

## 2022-08-30 RX ORDER — LOSARTAN POTASSIUM 25 MG/1
25 TABLET ORAL 2 TIMES DAILY
Qty: 120 TABLET | Refills: 1 | Status: SHIPPED
Start: 2022-08-30

## 2022-08-30 RX ORDER — LOSARTAN POTASSIUM 25 MG/1
50 TABLET ORAL 2 TIMES DAILY
Qty: 120 TABLET | Refills: 1 | Status: SHIPPED | OUTPATIENT
Start: 2022-08-30 | End: 2022-08-30 | Stop reason: DRUGHIGH

## 2022-08-30 NOTE — PATIENT INSTRUCTIONS
Continue current medications as prescribed. Continue with efforts at heart healthy diet and to get some regular exercise. Follow-up with your PCP as scheduled. Follow-up with Dr. Parmjit Rose in one year as scheduled or sooner if need.

## 2022-08-30 NOTE — TELEPHONE ENCOUNTER
Received call from pharmacist at [address] in [place]. Pt there to  scripts. Pt is telling her that he has been taking the Losartan 25 mg BID, not 50 mg BID. Script was sent for Losartan 50 mg BID. Pt is telling her, after his surgery it was changed to 25 mg BID. Which dose would like patient to be on? Please call [address] back at [phone].

## 2022-08-30 NOTE — PROGRESS NOTES
[facility] [facility] [address]  [place] [address]  Dept: [phone]  Dept Fax: [phone]  Loc: [phone]    Visit Date: 8/30/2022    Mr. Danny Durán is a 61 y.o. male  who presented for: 6-month checkup    Primary cardiologist: Milena Sapp MD  Chief Complaint   Patient presents with    6 Month Follow-Up       HPI:   HPI   Last seen in the office per Dr. Dee Rojas on 1/27/2022. Patient seen in evaluation as a new patient for palpitations. Per office note:  62 yo M hx of CABG 2018, DM II, bradycardia, neuropathy, RAISA on CPAP works as a  who presents to establish care. He noticed some fluttering in his chest last year. NP stopped Coreg. Fluttering stopped. He had COVID last year. Moved from [place] to [address] [place] [address] to retire. He notices a pounding heart beat. No lightheadedness, no dizziness. No significant a/t/d. Wife present. BB caused significant fatigue. No blood thinner. No chest pain, angina, TOMPKINS, orthopnea, PND, sob at rest, LE edema, or syncope. Right sided pinching headache at times as well. No major family hx.  7 kids total. Daily ASA compliant. Assessment/Plan   CABG 2018  DM II  HTN  HLD  Sinus bradycardia  Preserved EF   Ascending aorta 4.1 cm. Stress shows inferior defect with tanya-infarct ischemia  Records from [place], [facility], has had bleeding related to  mg q day, continue current therapy. BP 150s. Cut ASA back to 81 mg q day due to epistaxis. Usually BP 130s. Continue statin. Follow FLP. Discussed diet/exercise/BP/weight loss/health lifestyle choices/lipids; the patient understands the goals and will try to comply.   Disposition: 6 months      Current Outpatient Medications:     aspirin 81 MG EC tablet, Take 81 mg by mouth daily, Disp: , Rfl:     atorvastatin (LIPITOR) 40 MG tablet, Take 1 tablet by mouth daily, Disp: 30 tablet, Rfl: 1    FARXIGA 5 MG tablet, TAKE 1 TABLET BY MOUTH ONCE DAILY, Disp: 30 tablet, Rfl: 2    losartan (COZAAR) 25 MG tablet, Take 2 tablets by mouth 2 times daily, Disp: 120 tablet, Rfl: 1    metFORMIN (GLUCOPHAGE-XR) 500 MG extended release tablet, Take 1,000 mg by mouth 2 times daily , Disp: , Rfl:     zolpidem (AMBIEN) 5 MG tablet, Take 5 mg by mouth nightly as needed for Sleep., Disp: , Rfl:     blood glucose test strips (ASCENSIA AUTODISC VI;ONE TOUCH ULTRA TEST VI) strip, 1 each by In Vitro route daily As needed. , Disp: , Rfl:     Past Medical History  Jyothi Blake  has no past medical history on file. Social History  Jyothi Blake  reports that he quit smoking about 17 years ago. His smoking use included cigarettes. He smoked an average of 1 pack per day. He quit smokeless tobacco use about 17 years ago. His smokeless tobacco use included snuff. Family History  Jyothi Blake family history includes Heart Surgery in his father. There is no family history of bicuspid aortic valve, aneurysms, heart transplant, pacemakers, defibrillators, or sudden cardiac death.       Past Surgical History   Past Surgical History:   Procedure Laterality Date    CARDIAC SURGERY      CHOLECYSTECTOMY      COLONOSCOPY      HERNIA REPAIR      JOINT REPLACEMENT Right      Today's visit:   Subjective:  Has been doing well - except has had COVID x 2 since last appointment  Just really tired at times - legs turn to cement - rests and then does ok again  Occasional lingering cough  Each day 12 - 2 throat gets scratchy; head congestion - lasts until early tamela - then resolves - just really since last event of COVID; still having phantom smells  No chest pain or tightness  Some TOMPKINS - if rushing - if rests readily resolves  Performs ADL without issue  No swelling in feet or ankles  No bleeding issues since lower dose ASA  No further palpitations since medication change  BP normally runs 130/80 - has been fine at last 2 visits with PCP  Had AAA measured yesterday  Testing on prostate in process - PSA in process  Dr. Delfino Muniz following AAA, and lipid levels    Review of Systems   Constitutional: Negative for chills and fever  HENT: some congestion, sinus pressure, sneezing and scratchy throat as above. Eyes: Negative for pain, discharge, redness and itching. Respiratory: Negative for PND, orthopnea; (+) occasional cough  Gastrointestinal: Negative for blood in stool, constipation, diarrhea   Endocrine: Negative for cold intolerance, heat intolerance, polydipsia. Genitourinary: Negative for dysuria, hematuria. Musculoskeletal: Negative for arthralgias, joint swelling and neck pain. Neurological: Negative for numbness and headaches. Psychiatric/Behavioral: Negative for agitation, confusion, decreased concentration and dysphoric mood. Objective:     BP (!) 152/93   Pulse 82   Ht 5' 10\" (1.778 m)   Wt 278 lb 9.6 oz (126.4 kg)   BMI 39.97 kg/m²     Wt Readings from Last 3 Encounters:   08/30/22 278 lb 9.6 oz (126.4 kg)   01/27/22 283 lb 3.2 oz (128.5 kg)   01/10/22 272 lb (123.4 kg)     BP Readings from Last 3 Encounters:   08/30/22 (!) 152/93   01/27/22 (!) 140/74   10/21/21 136/82       Nursing note and vitals reviewed. Physical Exam   Constitutional: Oriented to person, place, and time. Appears well-developed and well-nourished. Pleasant, talkative; appears well. Accompanied by his spouse. HENT:   Head: Normocephalic and atraumatic. Eyes: EOM are normal. Pupils are equal, round, and reactive to light. Neck: Normal range of motion. Neck supple. No JVD present. No carotid bruits. Cardiovascular: Normal rate, regular rhythm, normal heart sounds and intact distal pulses. No murmur heard. Pulmonary/Chest: Effort normal and breath sounds normal. No respiratory distress. No wheezes. No rales. Abdominal: Obese; Soft. Bowel sounds are normal. No distension. There is no tenderness. Musculoskeletal: Normal range of motion. No edema.    Neurological: Alert and oriented to person, place, and time. No cranial nerve deficit. Coordination normal.   Skin: Skin is warm and dry. Psychiatric: Normal mood and affect. No results found for: CKTOTAL, CKMB, CKMBINDEX    No results found for: WBC, RBC, HGB, HCT, MCV, MCH, MCHC, RDW, PLT, MPV    No results found for: NA, K, CL, CO2, BUN, LABALBU, CREATININE, CALCIUM, GFRAA, LABGLOM, GLUCOSE, GLU    No results found for: ALKPHOS, ALT, AST, PROT, BILITOT, BILIDIR, IBILI, LABALBU    No results found for: MG    No results found for: INR, PROTIME      No results found for: LABA1C    No results found for: TRIG, HDL, LDLCALC, LDLDIRECT, LABVLDL    No results found for: TSH      Testing Reviewed:      I have individually reviewed the cardiac test below:    ECHO: 01/10/22    Indications:Bradycardia. Summary  Ejection fraction was estimated at 50-55%. The aortic valve was trileaflet with increased thickness and reduced  cuspal separation. DOPPLER: Transaortic velocity was 2.3 m/s, mean  gradient 11 mmHg, max gradient 21 mmHg, LEONEL 2.4 cm2. There was trace  aortic regurgitation. Ascending aorta = 4.1 cm. IVC size is within normal limits with normal respiratory phasic changes. Signature  ----------------------------------------------------------------  Electronically signed by Cheryl Peraza MD (Interpreting  physician) on 01/11/2022 at 07:21 AM  ----------------------------------------------------------------  Lexiscan stress test: 1/13/2022  Summary   Severe inferior defect from the base to the proximal mid ventricle   suggestive of infarction in the RCA and/or LCX territory with associated   tanya-infarction ischemia. LVEF is reduced at 40-45%      Recommendation   Clinical correlation is recommended. Aggressive risk factor management. Invasive ischemia workup is recommended if clinically indicated.       Signatures    ----------------------------------------------------------------   Electronically signed by Cheryl Peraza MD (Interpreting Cardiologist) on 01/13/2022 at 06:11     8/29/22:    PROCEDURE: US ABDOMINAL AORTA LIMITED       CLINICAL INFORMATION: Abdominal aortic aneurysm without rupture (HCC)       COMPARISON.: CT abdomen and pelvis, 7/31/2019       TECHNIQUE: Multiple grayscale and color flow sonographic images of the abdominal aorta and common iliac arteries were obtained. Spectral Doppler waveforms were also generated for each of these vessels. FINDINGS: Borderline aneurysmal upper abdominal aorta, 3.2 cm. There is a 5 cm fusiform aneurysm of the distal abdominal aorta which has increased in size from prior CT abdomen and pelvis dated 7/31/2019. No para-aortic mass lesion or fluid collection is    seen. No evidence for dissection. Mildly ectatic right common iliac artery. .        Proximal Abdominal Aorta   Trans - 3.2 cm   AP - 3.0 cm       Mid Abdominal Aorta   Trans - 2.6 cm   AP - 2.6 cm       Distal Abdominal Aorta   Trans - 4.9 cm   AP - 5.0 cm       Right Common Iliac Artery   Trans = 1.6 cm   AP = 1.5 cm       Left Common Iliac Artery   Trans - 1.1 cm   AP - 0.9 cm           Impression   5 cm fusiform aneurysm distal abdominal aorta. **This report has been created using voice recognition software. It may contain minor errors which are inherent in voice recognition technology. **       Final report electronically signed by Dr. Christian Garcia on 8/29/2022 10:18 AM     2019: CT abdomen  PROCEDURE: CT ABDOMEN PELVIS W IV CONTRAST       CLINICAL INFORMATION: Abdominal pain, right lower quadrant . COMPARISON: None. TECHNIQUE: 5 mm axial CT images were obtained through the abdomen and pelvis after the administration of intravenous and oral contrast. Coronal and sagittal reconstructions were obtained. All CT scans at this facility use dose modulation, iterative reconstruction, and/or weight-based dosing when appropriate to reduce radiation dose to as low as reasonably achievable.        FINDINGS:       Minimal atelectasis in the right lung base. There is nodular hepatic contour. Correlate with LFTs for underlying cirrhosis. Cholecystectomy. Spleen, pancreas are unremarkable. There is mild left adrenal gland thickening versus nodularity. Kidneys are symmetric without hydronephrosis. The level of the renal arteries there is an abdominal aortic aneurysm measuring 4.7 x 4.2 cm atherosclerotic changes. There are few shotty retroperitoneal lymph nodes which are nonspecific. There is scattered stool in the colon. There is no bowel wall thickening or evidence for obstruction at this time. Normal appendix. No bladder wall thickening. Few punctate calcifications in the prostate gland. Transverse diameter is 5.8 cm. Correlation with serology is advised. Fatty right inguinal hernia. Degenerative changes in the lumbar spine and pelvis with facet hypertrophy. A left hip prosthesis. Mild anterolisthesis L5 on S1 with discogenic disease. Impression       1. Nodular hepatic contour, correlate with LFTs for underlying cirrhosis. 2. 4.7 x 4.2 cm saccular abdominal aortic aneurysm at the level of L3. Correlation and comparison with prior study is advised. 3. Scattered stool in the colon. No evidence for bowel wall thickening or obstruction. **This report has been created using voice recognition software. It may contain minor errors which are inherent in voice recognition technology. **       Final report electronically signed by Dr. Edy Quijano on 7/31/2019 12:30 PM     Assessment/Plan   CABG 2018  DM II  HTN  HLD  Sinus rhythm  Preserved EF   Ascending aorta 4.1 cm. Stress shows inferior defect with tanya-infarct ischemia  Records from [place], [facility], has had bleeding related to  mg q day, ASA cut back to 81 mg q day due to epistaxis. No anginal sx or changes in his breathing. COVD x 2 since last visit as outlied above. BP little higher in office this AM. Usually BP 130s. No palpitations. No evidence of decompensated HF, euvolemic. Tolerating meds; compliant. Continue statin. Follow FLP. Continue follow-up with PCP - follows his Lipids and other labs. Follow-up to his known aneurysm in process. Following with PCP for work-up related to elevated PSA. Discussed diet/exercise/BP/weight loss/health lifestyle choices/lipids; the patient understands the goals and will try to comply.  Planning to begin exercise program.     Disposition:1 year           Electronically signed by MART Chen CNP   8/30/2022 at 3:21 PM EST

## 2022-11-09 DIAGNOSIS — I50.30 HEART FAILURE WITH PRESERVED EJECTION FRACTION, UNSPECIFIED HF CHRONICITY (HCC): ICD-10-CM

## 2022-11-09 DIAGNOSIS — Z95.1 HX OF CABG: ICD-10-CM

## 2022-11-09 DIAGNOSIS — E78.5 HYPERLIPIDEMIA, UNSPECIFIED HYPERLIPIDEMIA TYPE: ICD-10-CM

## 2022-11-09 RX ORDER — LOSARTAN POTASSIUM 25 MG/1
25 TABLET ORAL 2 TIMES DAILY
Qty: 180 TABLET | Refills: 2 | Status: SHIPPED | OUTPATIENT
Start: 2022-11-09

## 2022-11-09 RX ORDER — ATORVASTATIN CALCIUM 40 MG/1
40 TABLET, FILM COATED ORAL DAILY
Qty: 90 TABLET | Refills: 2 | Status: SHIPPED | OUTPATIENT
Start: 2022-11-09

## 2022-11-09 RX ORDER — DAPAGLIFLOZIN 5 MG/1
TABLET, FILM COATED ORAL
Qty: 90 TABLET | Refills: 2 | Status: SHIPPED | OUTPATIENT
Start: 2022-11-09

## 2022-11-09 NOTE — TELEPHONE ENCOUNTER
Patient was last seen 8/30/2022 by Gurpreet Lisa and his next appt is 8/14/2023 with Dr David Topete. He needs refills of his losartan 25 mg twice daily sent to [facility] in [address] [facility]. He is asking for 90 day supplies with refills until his next appt if possible? And he needs this sent in today if possible, he is going out of town tomorrow morning. Please advise.

## 2023-02-27 ENCOUNTER — HOSPITAL ENCOUNTER (OUTPATIENT)
Age: 68
Discharge: HOME | End: 2023-02-27
Payer: MEDICARE

## 2023-02-27 ENCOUNTER — HOSPITAL ENCOUNTER (OUTPATIENT)
Dept: CT IMAGING | Age: 68
Discharge: HOME | End: 2023-02-27
Payer: MEDICARE

## 2023-02-27 DIAGNOSIS — I71.40 ABDOMINAL AORTIC ANEURYSM (AAA) WITHOUT RUPTURE, UNSPECIFIED PART: ICD-10-CM

## 2023-02-27 LAB
CREAT SERPL-MCNC: 0.9 MG/DL (ref 0.4–1.2)
GFR SERPL CREATININE-BSD FRML MDRD: > 60 ML/MIN/1.73M2
POC CREATININE WHOLE BLOOD: 1 MG/DL (ref 0.5–1.2)

## 2023-02-27 PROCEDURE — 82565 ASSAY OF CREATININE: CPT

## 2023-02-27 PROCEDURE — 6360000004 HC RX CONTRAST MEDICATION: Performed by: THORACIC SURGERY (CARDIOTHORACIC VASCULAR SURGERY)

## 2023-02-27 PROCEDURE — 36415 COLL VENOUS BLD VENIPUNCTURE: CPT

## 2023-02-27 PROCEDURE — 74174 CTA ABD&PLVS W/CONTRAST: CPT

## 2023-02-27 RX ADMIN — IOPAMIDOL 85 ML: 755 INJECTION, SOLUTION INTRAVENOUS at 08:08

## 2023-07-01 DIAGNOSIS — Z95.1 HX OF CABG: ICD-10-CM

## 2023-07-01 DIAGNOSIS — I50.30 HEART FAILURE WITH PRESERVED EJECTION FRACTION, UNSPECIFIED HF CHRONICITY (HCC): ICD-10-CM

## 2023-07-03 RX ORDER — LOSARTAN POTASSIUM 25 MG/1
TABLET ORAL
Qty: 180 TABLET | Refills: 1 | Status: SHIPPED | OUTPATIENT
Start: 2023-07-03

## 2023-08-24 DIAGNOSIS — E78.5 HYPERLIPIDEMIA, UNSPECIFIED HYPERLIPIDEMIA TYPE: ICD-10-CM

## 2023-08-24 DIAGNOSIS — Z95.1 HX OF CABG: ICD-10-CM

## 2023-08-24 RX ORDER — ATORVASTATIN CALCIUM 40 MG/1
TABLET, FILM COATED ORAL
Qty: 90 TABLET | Refills: 3 | Status: SHIPPED | OUTPATIENT
Start: 2023-08-24

## 2023-09-06 ENCOUNTER — OFFICE VISIT (OUTPATIENT)
Dept: CARDIOLOGY CLINIC | Age: 68
End: 2023-09-06
Payer: MEDICARE

## 2023-09-06 VITALS
WEIGHT: 253 LBS | SYSTOLIC BLOOD PRESSURE: 132 MMHG | HEART RATE: 70 BPM | BODY MASS INDEX: 36.22 KG/M2 | HEIGHT: 70 IN | DIASTOLIC BLOOD PRESSURE: 82 MMHG

## 2023-09-06 DIAGNOSIS — E78.5 HYPERLIPIDEMIA, UNSPECIFIED HYPERLIPIDEMIA TYPE: ICD-10-CM

## 2023-09-06 DIAGNOSIS — I50.30 HEART FAILURE WITH PRESERVED EJECTION FRACTION, UNSPECIFIED HF CHRONICITY (HCC): ICD-10-CM

## 2023-09-06 DIAGNOSIS — Z95.1 HX OF CABG: Primary | ICD-10-CM

## 2023-09-06 PROCEDURE — 99214 OFFICE O/P EST MOD 30 MIN: CPT | Performed by: INTERNAL MEDICINE

## 2023-09-06 PROCEDURE — 1123F ACP DISCUSS/DSCN MKR DOCD: CPT | Performed by: INTERNAL MEDICINE

## 2023-09-06 PROCEDURE — 3017F COLORECTAL CA SCREEN DOC REV: CPT | Performed by: INTERNAL MEDICINE

## 2023-09-06 PROCEDURE — G8417 CALC BMI ABV UP PARAM F/U: HCPCS | Performed by: INTERNAL MEDICINE

## 2023-09-06 PROCEDURE — G8428 CUR MEDS NOT DOCUMENT: HCPCS | Performed by: INTERNAL MEDICINE

## 2023-09-06 PROCEDURE — 93000 ELECTROCARDIOGRAM COMPLETE: CPT | Performed by: INTERNAL MEDICINE

## 2023-09-06 PROCEDURE — 1036F TOBACCO NON-USER: CPT | Performed by: INTERNAL MEDICINE

## 2023-09-06 RX ORDER — DUTASTERIDE 0.5 MG/1
0.5 CAPSULE, LIQUID FILLED ORAL DAILY
COMMUNITY

## 2023-09-06 RX ORDER — ATORVASTATIN CALCIUM 40 MG/1
40 TABLET, FILM COATED ORAL DAILY
Qty: 90 TABLET | Refills: 3 | Status: SHIPPED | OUTPATIENT
Start: 2023-09-06

## 2023-09-06 RX ORDER — OXYBUTYNIN CHLORIDE 10 MG/1
10 TABLET, EXTENDED RELEASE ORAL DAILY
COMMUNITY

## 2023-09-06 RX ORDER — LOSARTAN POTASSIUM 25 MG/1
25 TABLET ORAL 2 TIMES DAILY
Qty: 180 TABLET | Refills: 3 | Status: SHIPPED | OUTPATIENT
Start: 2023-09-06

## 2023-09-06 RX ORDER — METOPROLOL SUCCINATE 25 MG/1
25 TABLET, EXTENDED RELEASE ORAL DAILY
Qty: 30 TABLET | Refills: 3 | Status: SHIPPED | OUTPATIENT
Start: 2023-09-06

## 2023-09-06 NOTE — PROGRESS NOTES
[address] [address]  Dept: [phone]  Dept Fax: [phone]  Loc: [phone]    Visit Date: 9/6/2023    Mr. Xochitl Cottrell is a 74 y.o. male  who presented for:  Chief Complaint   Patient presents with    1 Year Follow Up    Coronary Artery Disease       HPI:   HPI   75 yo M hx of CABG 2018, DM II, bradycardia, neuropathy, RAISA on CPAP who presents for follow-up. No chest pain, angina, TOMPKINS, orthopnea, PND, sob at rest, palpitations, LE edema, or syncope. Has some sternal scar related discomfort. Getting labs today. Follows with Nick. Current Outpatient Medications:     dutasteride (AVODART) 0.5 MG capsule, Take 1 capsule by mouth daily, Disp: , Rfl:     oxybutynin (DITROPAN-XL) 10 MG extended release tablet, Take 1 tablet by mouth daily, Disp: , Rfl:     atorvastatin (LIPITOR) 40 MG tablet, Take 1 tablet by mouth once daily, Disp: 90 tablet, Rfl: 3    losartan (COZAAR) 25 MG tablet, Take 1 tablet by mouth twice daily, Disp: 180 tablet, Rfl: 1    FARXIGA 5 MG tablet, TAKE 1 TABLET BY MOUTH ONCE DAILY, Disp: 90 tablet, Rfl: 2    aspirin 81 MG EC tablet, Take 1 tablet by mouth daily, Disp: , Rfl:     metFORMIN (GLUCOPHAGE-XR) 500 MG extended release tablet, Take 2 tablets by mouth 2 times daily, Disp: , Rfl:     zolpidem (AMBIEN) 5 MG tablet, Take 1 tablet by mouth nightly as needed for Sleep., Disp: , Rfl:     blood glucose test strips (ASCENSIA AUTODISC VI;ONE TOUCH ULTRA TEST VI) strip, 1 each by In Vitro route daily As needed. , Disp: , Rfl:     Past Medical History  Dwayne Davis  has a past medical history of CAD (coronary artery disease) and Diabetes mellitus ([address] [facility]). Social History  Dwayne Davis  reports that he quit smoking about 18 years ago. His smoking use included cigarettes. He smoked an average of 1 pack per day. He quit smokeless tobacco use about 18 years ago.   His smokeless tobacco use included

## 2023-10-19 ENCOUNTER — HOSPITAL ENCOUNTER (OUTPATIENT)
Age: 68
Discharge: HOME | End: 2023-10-19
Payer: COMMERCIAL

## 2023-10-19 ENCOUNTER — HOSPITAL ENCOUNTER (OUTPATIENT)
Dept: GENERAL RADIOLOGY | Age: 68
Discharge: HOME | End: 2023-10-19
Payer: COMMERCIAL

## 2023-10-19 DIAGNOSIS — R52 PAIN: ICD-10-CM

## 2023-10-19 PROCEDURE — 73630 X-RAY EXAM OF FOOT: CPT
